# Patient Record
Sex: MALE | Race: WHITE | Employment: UNEMPLOYED | URBAN - METROPOLITAN AREA
[De-identification: names, ages, dates, MRNs, and addresses within clinical notes are randomized per-mention and may not be internally consistent; named-entity substitution may affect disease eponyms.]

---

## 2018-11-07 ENCOUNTER — TRANSFERRED RECORDS (OUTPATIENT)
Dept: HEALTH INFORMATION MANAGEMENT | Facility: CLINIC | Age: 63
End: 2018-11-07

## 2019-01-07 ENCOUNTER — TRANSFERRED RECORDS (OUTPATIENT)
Dept: HEALTH INFORMATION MANAGEMENT | Facility: CLINIC | Age: 64
End: 2019-01-07

## 2019-01-18 ENCOUNTER — TRANSFERRED RECORDS (OUTPATIENT)
Dept: HEALTH INFORMATION MANAGEMENT | Facility: CLINIC | Age: 64
End: 2019-01-18

## 2019-02-04 ENCOUNTER — TRANSFERRED RECORDS (OUTPATIENT)
Dept: HEALTH INFORMATION MANAGEMENT | Facility: CLINIC | Age: 64
End: 2019-02-04

## 2019-02-18 ENCOUNTER — DOCUMENTATION ONLY (OUTPATIENT)
Dept: CARE COORDINATION | Facility: CLINIC | Age: 64
End: 2019-02-18

## 2019-02-21 ENCOUNTER — OFFICE VISIT (OUTPATIENT)
Dept: NEUROLOGY | Facility: CLINIC | Age: 64
End: 2019-02-21

## 2019-02-21 VITALS
BODY MASS INDEX: 41.59 KG/M2 | TEMPERATURE: 98.4 F | HEART RATE: 77 BPM | WEIGHT: 274.4 LBS | DIASTOLIC BLOOD PRESSURE: 73 MMHG | OXYGEN SATURATION: 95 % | SYSTOLIC BLOOD PRESSURE: 148 MMHG | HEIGHT: 68 IN

## 2019-02-21 DIAGNOSIS — G60.9 IDIOPATHIC PERIPHERAL NEUROPATHY: Primary | ICD-10-CM

## 2019-02-21 RX ORDER — COVID-19 ANTIGEN TEST
220 KIT MISCELLANEOUS 2 TIMES DAILY WITH MEALS
COMMUNITY

## 2019-02-21 RX ORDER — DIPHENOXYLATE HYDROCHLORIDE AND ATROPINE SULFATE 2.5; .025 MG/1; MG/1
1 TABLET ORAL
COMMUNITY
Start: 2014-07-17

## 2019-02-21 RX ORDER — DULOXETIN HYDROCHLORIDE 20 MG/1
20 CAPSULE, DELAYED RELEASE ORAL 2 TIMES DAILY
Qty: 180 CAPSULE | Refills: 3 | Status: SHIPPED | OUTPATIENT
Start: 2019-02-21 | End: 2019-04-04

## 2019-02-21 RX ORDER — ASPIRIN 81 MG/1
81 TABLET ORAL
COMMUNITY
Start: 2014-07-17

## 2019-02-21 RX ORDER — TRAZODONE HYDROCHLORIDE 100 MG/1
100 TABLET ORAL
COMMUNITY
Start: 2018-08-31

## 2019-02-21 RX ORDER — ATENOLOL AND CHLORTHALIDONE TABLET 50; 25 MG/1; MG/1
1 TABLET ORAL
COMMUNITY
Start: 2018-08-31

## 2019-02-21 RX ORDER — MIRTAZAPINE 15 MG/1
15 TABLET, FILM COATED ORAL
COMMUNITY
Start: 2018-08-31

## 2019-02-21 RX ORDER — SERTRALINE HYDROCHLORIDE 100 MG/1
100 TABLET, FILM COATED ORAL
COMMUNITY
Start: 2018-08-31

## 2019-02-21 RX ORDER — GABAPENTIN 300 MG/1
300 CAPSULE ORAL 3 TIMES DAILY
COMMUNITY
Start: 2018-09-21

## 2019-02-21 RX ORDER — SIMVASTATIN 40 MG
40 TABLET ORAL
COMMUNITY
Start: 2018-08-31

## 2019-02-21 SDOH — HEALTH STABILITY: MENTAL HEALTH: HOW OFTEN DO YOU HAVE A DRINK CONTAINING ALCOHOL?: NEVER

## 2019-02-21 ASSESSMENT — ENCOUNTER SYMPTOMS
POLYDIPSIA: 1
SINUS CONGESTION: 0
TINGLING: 1
PALPITATIONS: 0
SYNCOPE: 0
DISTURBANCES IN COORDINATION: 1
JOINT SWELLING: 0
DECREASED APPETITE: 0
HEADACHES: 0
POLYPHAGIA: 0
ALTERED TEMPERATURE REGULATION: 1
TROUBLE SWALLOWING: 1
SKIN CHANGES: 0
CHILLS: 1
MYALGIAS: 1
PANIC: 0
HOARSE VOICE: 0
NUMBNESS: 1
FEVER: 0
INSOMNIA: 1
HYPOTENSION: 0
DIZZINESS: 1
SLEEP DISTURBANCES DUE TO BREATHING: 0
HYPERTENSION: 0
NECK MASS: 0
SEIZURES: 0
ORTHOPNEA: 0
WEIGHT LOSS: 0
STIFFNESS: 1
SINUS PAIN: 0
MEMORY LOSS: 1
HALLUCINATIONS: 0
DEPRESSION: 1
SORE THROAT: 0
ARTHRALGIAS: 1
NIGHT SWEATS: 1
TREMORS: 0
PARALYSIS: 0
SMELL DISTURBANCE: 0
NERVOUS/ANXIOUS: 1
POOR WOUND HEALING: 0
SPEECH CHANGE: 0
WEIGHT GAIN: 1
WEAKNESS: 1
EXERCISE INTOLERANCE: 0
LOSS OF CONSCIOUSNESS: 0
MUSCLE CRAMPS: 1
MUSCLE WEAKNESS: 1
FATIGUE: 1
TASTE DISTURBANCE: 0
DECREASED CONCENTRATION: 0
BACK PAIN: 0
NAIL CHANGES: 0
NECK PAIN: 0
LIGHT-HEADEDNESS: 0
INCREASED ENERGY: 1
LEG PAIN: 1

## 2019-02-21 ASSESSMENT — PATIENT HEALTH QUESTIONNAIRE - PHQ9: SUM OF ALL RESPONSES TO PHQ QUESTIONS 1-9: 10

## 2019-02-21 ASSESSMENT — MIFFLIN-ST. JEOR: SCORE: 2019.17

## 2019-02-21 ASSESSMENT — PAIN SCALES - GENERAL: PAINLEVEL: SEVERE PAIN (7)

## 2019-02-21 NOTE — PROGRESS NOTES
DEPARTMENT OF NEUROLOGY    Patient Name:  Luis Patterson  MRN:  [id number]    :  1955  Date of Clinic Visit:  2019  Primary Care Provider:  Prakash Singer    CHIEF COMPLAINT: peripheral neuropathy    HISTORY OF PRESENT ILLNESS:  Luis Patterson is a 63 year old male with history of remote alcohol abuse, CAD, HLD, and depression who presents to general neurology clinic in consultation from the [facility] of neurology for evaluation of peripheral neuropathy. He reports several months of diminished sensation up to the mid thigh, but has no complaint regarding the hands at rest. He has noted left foot catching when he walks and sometimes his left hand gets numb while driving. He describes a feeling like a vice  on L foot, R less often. When he  stands up after sitting for an extended period, he says it feels like needles through his feet. He has also noted hypersensitivity in the feet, that sometimes they feel swollen, cold, or like they are sweating, when none of these things are the case. While walking, he has noted that he does not feel like he knows where his left leg is in space. This does not keep him up at night, but he is on trazodone for sleep. He has noted that after taking a few steps, these sensations become more tolerable. He tried insoles, but this didn't help. He was prescribed gabapentin and has increased up to 600 mg TID, but this makes him feel drowsy and imbalanced. Sometimes he says it feels like the muscles are twitching in the L calf and he can see the muscle rippling under the surface of the skin.     He was a former heavy drinker, but has been sober for 19 years.   Former tobacco use 20 years ago PPD, has started chewing tobacco for 5 years.    Never had a fasting glucose, A1c 6.2. B12 458, SPEP normal ESR 26, lyme negative,     No gastric surgery, no family hx of pernicious anemia    ASSESSMENT AND PLAN:  1. Length dependent axonal sensory and motor peripheral  "neuropathy- likely secondary to impaired fasting glucose and/or remote EtOH abuse, however as neither of these fit perfectly (HgbA1c not particularly high, has not had alcohol in 19 years), will obtain second round of labs for reversible causes of neuropathy.    Plan:  - Glucose tolerance test  - MMA, B1, B6, TSH reflex fT4  - given instructions to titrate off of gabapentin  - start duloxetine 20 mg BID  - RTC 4-6 weeks    Patient was seen and discussed with Dr. Le.    Nelida Strauss MD  PGY-3 Resident  [facility] Department of Neurology  Pager: [phone]      I saw and evaluated the patient and discussed the plan of care with Dr. Strauss. I agree with the findings, impression and plan as described by Dr. Strauss.   Shira Le MD        PHYSICAL EXAMINATION:  Vitals: /73 (BP Location: Left arm, Patient Position: Sitting, Cuff Size: Adult Regular)   Pulse 77   Temp 98.4  F (36.9  C) (Oral)   Ht 1.727 m (5' 8\")   Wt 124.5 kg (274 lb 6.4 oz)   SpO2 95%   BMI 41.72 kg/m    General: adult male patient, seated on chair, NAD, unaccompanied  HEENT: at/nc, oropharynx clear, mucosa moist  Cardiac: RRR, no m/r/g  Pulmonary: CTAB, nonlabored on RA  Abdomen: soft, nontender, nondistended, BS+  Extremities: warm, dry, w/o edema  Skin: no jaundice or rash. Absent hair growth distal to knees. Onychomycotic toenails.  Psych: pleasant affect and congruent mood, thought content w/o abnormality, process linear and logical  Neuro: fundi benign   Mental status: alert and oriented to person, place, and time; language is fluent with intact comprehension and naming   Cranial nerves: VFF, PERRL, EOMI, no facial asymmetry, facial sensation intact, tongue and uvula are midline, no dysarthria   Motor: No abnormal posture, tone, atrophy, or movements/fasciculations.    Biceps Triceps Deltoid Hip flexor Knee extension Knee flexion Ankle extension Ankle flexion   Right 5 5 5 5 5 5 5 5 5   Left 5 5 5 5 " 5 5 5 5 5    Reflexes: Absent Babinski. Absent Mosher.   Brachioradialis Biceps Triceps Patellar Achilles   Right 1 1 1 0 0   Left 1 1 1 0 0    Sensory: Diminished to light touch, pin prick, and vibration in feet, improved in a gradient manner up calves to knees. Absent proprioception at toes, intact at ankles. +Romberg.   Coordination: Intact FNF and heel-shin. No Dysmetria. No Dysdiadochokinesia.   Gait: Normal width, stride length, turn, and arm swing.    INVESTIGATIONS:   OSH EMG reviewed, in notes tab  OSH labs listed above    REVIEW OF SYSTEMS: 12-point RoS negative except as per HPI.    ALLERGIES:  Allergies   Allergen Reactions     Penicillins Anaphylaxis     MEDICATIONS:  Current Outpatient Medications   Medication Sig Dispense Refill     aspirin 81 MG EC tablet Take 81 mg by mouth       atenolol-chlorthalidone (TENORETIC) 50-25 MG tablet Take 1 tablet by mouth       DULoxetine (CYMBALTA) 20 MG capsule Take 1 capsule (20 mg) by mouth 2 times daily 180 capsule 3     gabapentin (NEURONTIN) 300 MG capsule Take 300 mg by mouth       mirtazapine (REMERON) 15 MG tablet Take 15 mg by mouth       Multiple Vitamin (MULTI-VITAMINS) TABS Take 1 tablet by mouth       naproxen sodium 220 MG capsule Take 220 mg by mouth 2 times daily (with meals)       sertraline (ZOLOFT) 100 MG tablet Take 100 mg by mouth       simvastatin (ZOCOR) 40 MG tablet Take 40 mg by mouth       traZODone (DESYREL) 100 MG tablet Take 100 mg by mouth       PAST MEDICAL HISTORY:  No past medical history on file.  PAST SURGICAL HISTORY:  No past surgical history on file.  SOCIAL HISTORY:  Social History     Socioeconomic History     Marital status:      Spouse name: Not on file     Number of children: Not on file     Years of education: Not on file     Highest education level: Not on file   Occupational History     Not on file   Social Needs     Financial resource strain: Not on file     Food insecurity:     Worry: Not on file     Inability:  Not on file     Transportation needs:     Medical: Not on file     Non-medical: Not on file   Tobacco Use     Smoking status: Never Smoker     Smokeless tobacco: Current User     Types: Chew   Substance and Sexual Activity     Alcohol use: No     Frequency: Never     Drug use: No     Sexual activity: Not on file   Lifestyle     Physical activity:     Days per week: Not on file     Minutes per session: Not on file     Stress: Not on file   Relationships     Social connections:     Talks on phone: Not on file     Gets together: Not on file     Attends [employer] service: Not on file     Active member of club or organization: Not on file     Attends meetings of clubs or organizations: Not on file     Relationship status: Not on file     Intimate partner violence:     Fear of current or ex partner: Not on file     Emotionally abused: Not on file     Physically abused: Not on file     Forced sexual activity: Not on file   Other Topics Concern     Not on file   Social History Narrative     Not on file     FAMILY HISTORY:  No family history on file.    Answers for HPI/ROS submitted by the patient on 2/21/2019   General Symptoms: Yes  Skin Symptoms: Yes  HENT Symptoms: Yes  EYE SYMPTOMS: No  HEART SYMPTOMS: Yes  LUNG SYMPTOMS: No  INTESTINAL SYMPTOMS: No  URINARY SYMPTOMS: No  REPRODUCTIVE SYMPTOMS: No  SKELETAL SYMPTOMS: Yes  BLOOD SYMPTOMS: No  NERVOUS SYSTEM SYMPTOMS: Yes  MENTAL HEALTH SYMPTOMS: Yes  Fever: No  Loss of appetite: No  Weight loss: No  Weight gain: Yes  Fatigue: Yes  Night sweats: Yes  Chills: Yes  Increased stress: Yes  Excessive hunger: No  Excessive thirst: Yes  Feeling hot or cold when others believe the temperature is normal: Yes  Loss of height: No  Post-operative complications: No  Surgical site pain: No  Hallucinations: No  Change in or Loss of Energy: Yes  Hyperactivity: No  Confusion: No  Changes in hair: No  Changes in moles/birth marks: No  Itching: Yes  Rashes: Yes  Changes in nails: No  Acne:  No  Change in facial hair: No  Warts: No  Non-healing sores: No  Scarring: No  Flaking of skin: No  Color changes of hands/feet in cold : Yes  Sun sensitivity: Yes  Skin thickening: No  Ear pain: No  Ear discharge: No  Hearing loss: Yes  Tinnitus: No  Nosebleeds: No  Congestion: No  Sinus pain: No  Trouble swallowing: Yes   Voice hoarseness: No  Mouth sores: No  Sore throat: No  Tooth pain: No  Gum tenderness: No  Bleeding gums: No  Change in taste: No  Change in sense of smell: No  Dry mouth: Yes  Hearing aid used: No  Neck lump: No  Chest pain or pressure: No  Fast or irregular heartbeat: No  Pain in legs with walking: Yes  Trouble breathing while lying down: No  Fingers or toes appear blue: No  High blood pressure: No  Low blood pressure: No  Fainting: No  Murmurs: No  Pacemaker: No  Varicose veins: No  Edema or swelling: Yes  Wake up at night with shortness of breath: No  Light-headedness: No  Exercise intolerance: No  Back pain: No  Muscle aches: Yes  Neck pain: No  Swollen joints: No  Joint pain: Yes  Bone pain: No  Muscle cramps: Yes  Muscle weakness: Yes  Joint stiffness: Yes  Bone fracture: No  Trouble with coordination: Yes  Dizziness or trouble with balance: Yes  Fainting or black-out spells: No  Memory loss: Yes  Headache: No  Seizures: No  Speech problems: No  Tingling: Yes  Tremor: No  Weakness: Yes  Difficulty walking: Yes  Paralysis: No  Numbness: Yes  Nervous or Anxious: Yes  Depression: Yes  Trouble sleeping: Yes  Trouble thinking or concentrating: No  Mood changes: Yes  Panic attacks: No

## 2019-02-21 NOTE — PATIENT INSTRUCTIONS
Have blood tests drawn and results faxed to the neurology clinic.     Gradually decrease gabapentin. Stop taking the midday dose. After one week stop taking the am dose. Then after another week stop all together.    Start taking duloxetine or cymbalta 20 mg twice daily.    Return to clinic in 4-6 weeks to see how you are doing and follow up the lab results.

## 2019-02-21 NOTE — LETTER
2019       RE: Luis Patterson  [id number]  [facility] [address]     Dear Colleague,    Thank you for referring your patient, Luis Patterson, to the [facility] NEUROLOGY at [facility]. Please see a copy of my visit note below.      DEPARTMENT OF NEUROLOGY    Patient Name:  Luis Patterson  MRN:  [id number]    :  1955  Date of Clinic Visit:  2019  Primary Care Provider:  Prakash Singer    CHIEF COMPLAINT: peripheral neuropathy    HISTORY OF PRESENT ILLNESS:  Luis Patterson is a 63 year old male with history of remote alcohol abuse, CAD, HLD, and depression who presents to general neurology clinic in consultation from the [facility] of neurology for evaluation of peripheral neuropathy. He reports several months of diminished sensation up to the mid thigh, but has no complaint regarding the hands at rest. He has noted left foot catching when he walks and sometimes his left hand gets numb while driving. He describes a feeling like a vice  on L foot, R less often. When he  stands up after sitting for an extended period, he says it feels like needles through his feet. He has also noted hypersensitivity in the feet, that sometimes they feel swollen, cold, or like they are sweating, when none of these things are the case. While walking, he has noted that he does not feel like he knows where his left leg is in space. This does not keep him up at night, but he is on trazodone for sleep. He has noted that after taking a few steps, these sensations become more tolerable. He tried insoles, but this didn't help. He was prescribed gabapentin and has increased up to 600 mg TID, but this makes him feel drowsy and imbalanced. Sometimes he says it feels like the muscles are twitching in the L calf and he can see the muscle rippling under the surface of the skin.     He was a former heavy drinker, but has been sober for 19 years.   Former tobacco use 20 years ago  "PPD, has started chewing tobacco for 5 years.    Never had a fasting glucose, A1c 6.2. B12 458, SPEP normal ESR 26, lyme negative,     No gastric surgery, no family hx of pernicious anemia    ASSESSMENT AND PLAN:  1. Length dependent axonal sensory and motor peripheral neuropathy- likely secondary to impaired fasting glucose and/or remote EtOH abuse, however as neither of these fit perfectly (HgbA1c not particularly high, has not had alcohol in 19 years), will obtain second round of labs for reversible causes of neuropathy.    Plan:  - Glucose tolerance test  - MMA, B1, B6, TSH reflex fT4  - given instructions to titrate off of gabapentin  - start duloxetine 20 mg BID  - RTC 4-6 weeks    Patient was seen and discussed with Dr. Le.    Nelida Strauss MD  PGY-3 Resident  [facility] Department of Neurology  Pager: [phone]    I saw and evaluated the patient and discussed the plan of care with Dr. Strauss. I agree with the findings, impression and plan as described by Dr. Strauss.     Shira Le MD    PHYSICAL EXAMINATION:  Vitals: /73 (BP Location: Left arm, Patient Position: Sitting, Cuff Size: Adult Regular)   Pulse 77   Temp 98.4  F (36.9  C) (Oral)   Ht 1.727 m (5' 8\")   Wt 124.5 kg (274 lb 6.4 oz)   SpO2 95%   BMI 41.72 kg/m     General: adult male patient, seated on chair, NAD, unaccompanied  HEENT: at/nc, oropharynx clear, mucosa moist  Cardiac: RRR, no m/r/g  Pulmonary: CTAB, nonlabored on RA  Abdomen: soft, nontender, nondistended, BS+  Extremities: warm, dry, w/o edema  Skin: no jaundice or rash. Absent hair growth distal to knees. Onychomycotic toenails.  Psych: pleasant affect and congruent mood, thought content w/o abnormality, process linear and logical  Neuro: fundi benign   Mental status: alert and oriented to person, place, and time; language is fluent with intact comprehension and naming   Cranial nerves: VFF, PERRL, EOMI, no facial asymmetry, facial " sensation intact, tongue and uvula are midline, no dysarthria   Motor: No abnormal posture, tone, atrophy, or movements/fasciculations.    Biceps Triceps Deltoid Hip flexor Knee extension Knee flexion Ankle extension Ankle flexion   Right 5 5 5 5 5 5 5 5 5   Left 5 5 5 5 5 5 5 5 5    Reflexes: Absent Babinski. Absent Mosher.   Brachioradialis Biceps Triceps Patellar Achilles   Right 1 1 1 0 0   Left 1 1 1 0 0    Sensory: Diminished to light touch, pin prick, and vibration in feet, improved in a gradient manner up calves to knees. Absent proprioception at toes, intact at ankles. +Romberg.   Coordination: Intact FNF and heel-shin. No Dysmetria. No Dysdiadochokinesia.   Gait: Normal width, stride length, turn, and arm swing.    INVESTIGATIONS:   OSH EMG reviewed, in notes tab  OSH labs listed above    REVIEW OF SYSTEMS: 12-point RoS negative except as per HPI.    ALLERGIES:  Allergies   Allergen Reactions     Penicillins Anaphylaxis     MEDICATIONS:  Current Outpatient Medications   Medication Sig Dispense Refill     aspirin 81 MG EC tablet Take 81 mg by mouth       atenolol-chlorthalidone (TENORETIC) 50-25 MG tablet Take 1 tablet by mouth       DULoxetine (CYMBALTA) 20 MG capsule Take 1 capsule (20 mg) by mouth 2 times daily 180 capsule 3     gabapentin (NEURONTIN) 300 MG capsule Take 300 mg by mouth       mirtazapine (REMERON) 15 MG tablet Take 15 mg by mouth       Multiple Vitamin (MULTI-VITAMINS) TABS Take 1 tablet by mouth       naproxen sodium 220 MG capsule Take 220 mg by mouth 2 times daily (with meals)       sertraline (ZOLOFT) 100 MG tablet Take 100 mg by mouth       simvastatin (ZOCOR) 40 MG tablet Take 40 mg by mouth       traZODone (DESYREL) 100 MG tablet Take 100 mg by mouth       PAST MEDICAL HISTORY:  No past medical history on file.  PAST SURGICAL HISTORY:  No past surgical history on file.  SOCIAL HISTORY:  Social History     Socioeconomic History     Marital status:      Spouse name: Not  on file     Number of children: Not on file     Years of education: Not on file     Highest education level: Not on file   Occupational History     Not on file   Social Needs     Financial resource strain: Not on file     Food insecurity:     Worry: Not on file     Inability: Not on file     Transportation needs:     Medical: Not on file     Non-medical: Not on file   Tobacco Use     Smoking status: Never Smoker     Smokeless tobacco: Current User     Types: Chew   Substance and Sexual Activity     Alcohol use: No     Frequency: Never     Drug use: No     Sexual activity: Not on file   Lifestyle     Physical activity:     Days per week: Not on file     Minutes per session: Not on file     Stress: Not on file   Relationships     Social connections:     Talks on phone: Not on file     Gets together: Not on file     Attends Baptism service: Not on file     Active member of club or organization: Not on file     Attends meetings of clubs or organizations: Not on file     Relationship status: Not on file     Intimate partner violence:     Fear of current or ex partner: Not on file     Emotionally abused: Not on file     Physically abused: Not on file     Forced sexual activity: Not on file   Other Topics Concern     Not on file   Social History Narrative     Not on file     FAMILY HISTORY:  No family history on file.    Again, thank you for allowing me to participate in the care of your patient.      Sincerely,    Nelida Strauss MD

## 2019-02-22 ENCOUNTER — TELEPHONE (OUTPATIENT)
Dept: NEUROLOGY | Facility: CLINIC | Age: 64
End: 2019-02-22

## 2019-02-22 NOTE — TELEPHONE ENCOUNTER
[facility] Call Center    Phone Message    May a detailed message be left on voicemail: yes    Reason for Call: Other: Per call from Sydnee, PT is currently in the clinic waiting to have labs draw.  The hard copy lab orders with the PT wasn't signed. Sydnee is requesting for a signed lab order faxed to [phone] ASAP     Action Taken: Message routed to:  Other: Neurology

## 2019-02-25 ENCOUNTER — CARE COORDINATION (OUTPATIENT)
Dept: NEUROLOGY | Facility: CLINIC | Age: 64
End: 2019-02-25

## 2019-02-25 NOTE — TELEPHONE ENCOUNTER
Message sent to Dr. Strauss asking her to sign her clinic note and that the lab orders are not signed.  Patient is wanting to have his labs drawn.  Fax to [phone] once the orders are signed.

## 2019-02-27 ENCOUNTER — TRANSFERRED RECORDS (OUTPATIENT)
Dept: HEALTH INFORMATION MANAGEMENT | Facility: CLINIC | Age: 64
End: 2019-02-27

## 2019-02-28 ENCOUNTER — TELEPHONE (OUTPATIENT)
Dept: NEUROLOGY | Facility: CLINIC | Age: 64
End: 2019-02-28

## 2019-02-28 NOTE — TELEPHONE ENCOUNTER
[facility] Call Center    Phone Message    May a detailed message be left on voicemail: yes    Reason for Call: Other: Sydnee calling to report that they were unable to complete the Glucose test due to Luis's blood test being high.  The normal range is  and his was 131.  The test was attempted yesterday (2/27) in the morning.  Sydnee states that if Dr Strauss is wanting this attempted again, that a new order would need to be placed.  Their fax number is [phone].  Please call Sydnee with any questions regarding this and she suggests reaching out to Luis to discuss next steps     Action Taken: Message routed to:  [facility]

## 2019-03-04 ENCOUNTER — TELEPHONE (OUTPATIENT)
Dept: NEUROLOGY | Facility: CLINIC | Age: 64
End: 2019-03-04

## 2019-03-04 DIAGNOSIS — G60.9 IDIOPATHIC PERIPHERAL NEUROPATHY: Primary | ICD-10-CM

## 2019-03-04 NOTE — TELEPHONE ENCOUNTER
I sent a message to Dr. Strauss letting her know that the lab was unable to complete the glucose test because his blood sugar was 131.  We can do this test again but Dr. Strauss will need to place a new order.  I called Luis to let him know that I sent a message to Dr. Strauss and if she reorders this test I will let him know and fax the order to [facility] [phone].    Message sent to Dr. Strauss

## 2019-03-04 NOTE — TELEPHONE ENCOUNTER
[facility] Call Center    Phone Message    May a detailed message be left on voicemail: yes    Reason for Call: Other: Pt requesting call back. Pt stated that he was unable to get the test done for his blood sugar due to it being to high and he would like to discuss what the next steps are from here     Action Taken: Message routed to:  [facility] neuro

## 2019-03-05 ENCOUNTER — CARE COORDINATION (OUTPATIENT)
Dept: NEUROLOGY | Facility: CLINIC | Age: 64
End: 2019-03-05

## 2019-03-05 NOTE — PROGRESS NOTES
, MD Bertha Zendejas Tracey, RN             It is ordered. It needs to be clear that he is fasting for this. If his initial glucose is too high again, then I think he has met criteria for a diagnosis of diabetes from that alone.    Previous Messages      ----- Message -----   From: Virginie Stone RN   Sent: 3/4/2019   1:17 PM   To: Nelida Strauss MD     Lab was unable to complete is glucose lab because his blood sugar was too high.  131.  If you would like him to attempt this again a new order will need to be placed.  Please place order if in agreement.     Thank you,   Virginie         I faxed the glucose lab to [facility].  [phone].  I called Luis to let him know that I faxed this and he should call to schedule an appointment.

## 2019-03-08 ENCOUNTER — TRANSFERRED RECORDS (OUTPATIENT)
Dept: HEALTH INFORMATION MANAGEMENT | Facility: CLINIC | Age: 64
End: 2019-03-08

## 2019-04-04 ENCOUNTER — OFFICE VISIT (OUTPATIENT)
Dept: NEUROLOGY | Facility: CLINIC | Age: 64
End: 2019-04-04

## 2019-04-04 VITALS
HEART RATE: 71 BPM | WEIGHT: 209.6 LBS | DIASTOLIC BLOOD PRESSURE: 75 MMHG | HEIGHT: 68 IN | BODY MASS INDEX: 31.77 KG/M2 | SYSTOLIC BLOOD PRESSURE: 133 MMHG | TEMPERATURE: 98.1 F

## 2019-04-04 DIAGNOSIS — G60.9 IDIOPATHIC PERIPHERAL NEUROPATHY: ICD-10-CM

## 2019-04-04 RX ORDER — DULOXETIN HYDROCHLORIDE 20 MG/1
60 CAPSULE, DELAYED RELEASE ORAL DAILY
Qty: 180 CAPSULE | Refills: 3
Start: 2019-04-04 | End: 2021-12-03

## 2019-04-04 ASSESSMENT — PAIN SCALES - GENERAL: PAINLEVEL: NO PAIN (0)

## 2019-04-04 ASSESSMENT — MIFFLIN-ST. JEOR: SCORE: 1725.24

## 2019-04-04 NOTE — NURSING NOTE
Chief Complaint   Patient presents with     RECHECK     UMP RETURN MUSCLE WEAKNESS       Mariama Yuen, SHAWNN

## 2019-04-04 NOTE — PATIENT INSTRUCTIONS
Increase duloxetine to 60 mg daily, you can take this all at once or 20 in the am and 40 in the pm.    Gradually decrease the gabapentin and then stop taking it.    Discuss new diagnosis of diabetes with your primary doctor.    Keep up the good work with physical activity, exercise, and dietary changes.    Follow up in neurology clinic in 6 months.

## 2019-04-04 NOTE — PROGRESS NOTES
"  DEPARTMENT OF NEUROLOGY    Patient Name:  Luis Patterson  MRN:  [id number]    :  1955  Date of Clinic Visit:  2019  Primary Care Provider:  Prakash Singer        FOLLOW UP APPOINTMENT       HPI: 63 M hx remote EtOH abuse (19 years in remission), CAD, HLD, and depression who is followed for peripheral neuropathy. He was last seen 19.    INTERVAL HISTORY:  Neuropathy labs completed, found to have impaired response to glucose load, so now meets diagnosis of DM by two criteria (A1c 6.2 also). In response to concerns about these symptoms, he reports that he has lost 65 pounds in the past two months through diet and exercise.     He is no longer getting the vice  feeling in his legs, but his toes still hurt, still feel swelled up, legs still feel like they are sweating event though they are neither swollen nor sweating. He reports that the gabapentin didn't do anything on it's own, and the duloxetine is helping some.     He has fallen twice since he was last seen- left leg gave out from underneath him both times.     Vital signs:  /75 (BP Location: Left arm, Patient Position: Sitting, Cuff Size: Adult Regular)   Pulse 71   Temp 98.1  F (36.7  C) (Oral)   Ht 1.727 m (5' 8\")   Wt 95.1 kg (209 lb 9.6 oz)   PF 94 L/min   BMI 31.87 kg/m      Examination:   Physical exam is unchanged since last visit.     -General: Adult male patient, sitting comfortably on the chair. No acute distress    -HEENT: No skin discolorations noted, no carotid bruits     -Chest: RRR without murmurs or bruits     -Abdomen: Positive bowel sounds, soft, non-tender, no organomegaly    -Musculoskeletal: No abnormalities noted     -Neurological:     --MS: Patient is alert, attentive, and oriented. Speech is clear and fluid. Names normally. Registration, recall and remote memory intact. Mental math normal.     --CNs: Visual fields are full to confrontation. Normal fundoscopic exam with no visualized vascular changes. " Pupils are briskly reactive to light. Visual fields full. Ocular motility full without nystagmus, facial sensation intact, muscles of mastication and facial expression normal, hearing intact, gag and palate elevation normal, sternomastoid and trapezius function normal, tongue motions normal     --Motor: Normal muscle tone and bulk. 5/5 muscle strength bilaterally     --Reflexes: Brisk reflexes at knees and biceps and ankles. Plantar responses extensor bilaterally    --Sensory: Light touch, vibration and PP diminished in feet, pin increases in a graded fashion to normal a mid calf. Proprioception intact. Normal sensation at fingertips.    --Coordination: Rapidly alternating movements of fingers intact. Heel-shin and finger-nose-finger is intact. Negative Romberg.     --Gait: Stands with feet normally spaced. Gait is steady.    Assessment/Plan:   1. Peripheral neuropathy- secondary to diabetes, remote alcohol may have played a roll.  -Discuss treatment strategies with PCP, can make an endocrinology referral if he desires.   -Gradual taper off of gabapentin  -Increase duloxetine to 60 mg daily, okay to split dose  -RTC 6 months    Nelida Strauss MD  Neurology PGY-3  [facility]  Pager [phone]    Patient has been seen with Dr. Garcia.    I saw and evaluated the patient with the resident and agree with the assessment and plan. I was present for entire minor examination.    Sonia Garcia MD  Chief, Multiple Sclerosis Division  Department of Neurology  [facility]

## 2019-04-04 NOTE — LETTER
"2019       RE: Luis Patterson  [address]  [facility] [address]     Dear Colleague,    Thank you for referring your patient, Luis Patterson, to the [facility] NEUROLOGY at [facility]. Please see a copy of my visit note below.      DEPARTMENT OF NEUROLOGY    Patient Name:  Luis Patterson  MRN:  [id number]    :  1955  Date of Clinic Visit:  2019  Primary Care Provider:  Prakash Singer        FOLLOW UP APPOINTMENT     HPI: 63 M hx remote EtOH abuse (19 years in remission), CAD, HLD, and depression who is followed for peripheral neuropathy. He was last seen 19.    INTERVAL HISTORY:  Neuropathy labs completed, found to have impaired response to glucose load, so now meets diagnosis of DM by two criteria (A1c 6.2 also). In response to concerns about these symptoms, he reports that he has lost 65 pounds in the past two months through diet and exercise.     He is no longer getting the vice  feeling in his legs, but his toes still hurt, still feel swelled up, legs still feel like they are sweating event though they are neither swollen nor sweating. He reports that the gabapentin didn't do anything on it's own, and the duloxetine is helping some.     He has fallen twice since he was last seen- left leg gave out from underneath him both times.     Vital signs:  /75 (BP Location: Left arm, Patient Position: Sitting, Cuff Size: Adult Regular)   Pulse 71   Temp 98.1  F (36.7  C) (Oral)   Ht 1.727 m (5' 8\")   Wt 95.1 kg (209 lb 9.6 oz)   PF 94 L/min   BMI 31.87 kg/m       Examination:   Physical exam is unchanged since last visit.     -General: Adult male patient, sitting comfortably on the chair. No acute distress    -HEENT: No skin discolorations noted, no carotid bruits     -Chest: RRR without murmurs or bruits     -Abdomen: Positive bowel sounds, soft, non-tender, no organomegaly    -Musculoskeletal: No abnormalities noted     -Neurological:     --MS: " Patient is alert, attentive, and oriented. Speech is clear and fluid. Names normally. Registration, recall and remote memory intact. Mental math normal.     --CNs: Visual fields are full to confrontation. Normal fundoscopic exam with no visualized vascular changes. Pupils are briskly reactive to light. Visual fields full. Ocular motility full without nystagmus, facial sensation intact, muscles of mastication and facial expression normal, hearing intact, gag and palate elevation normal, sternomastoid and trapezius function normal, tongue motions normal     --Motor: Normal muscle tone and bulk. 5/5 muscle strength bilaterally     --Reflexes: Brisk reflexes at knees and biceps and ankles. Plantar responses extensor bilaterally    --Sensory: Light touch, vibration and PP diminished in feet, pin increases in a graded fashion to normal a mid calf. Proprioception intact. Normal sensation at fingertips.    --Coordination: Rapidly alternating movements of fingers intact. Heel-shin and finger-nose-finger is intact. Negative Romberg.     --Gait: Stands with feet normally spaced. Gait is steady.    Assessment/Plan:   1. Peripheral neuropathy- secondary to diabetes, remote alcohol may have played a roll.  -Discuss treatment strategies with PCP, can make an endocrinology referral if he desires.   -Gradual taper off of gabapentin  -Increase duloxetine to 60 mg daily, okay to split dose  -RTC 6 months    Nelida Strauss MD  Neurology PGY-3  [facility]  Pager [phone]    Patient has been seen with Dr. Garcia.    I saw and evaluated the patient with the resident and agree with the assessment and plan. I was present for entire minor examination.    Sonia Garcia MD  Chief, Multiple Sclerosis Division  Department of Neurology  [facility]

## 2019-05-13 ENCOUNTER — TELEPHONE (OUTPATIENT)
Dept: NEUROLOGY | Facility: CLINIC | Age: 64
End: 2019-05-13

## 2019-05-13 NOTE — TELEPHONE ENCOUNTER
Nurse received second request for script clarification on Dulonetine 20 mg. Script sent to pharmacy, however for uknown reason they did not get it.  Nurse called script to pharmacy and left on call lione there:   Duloxetine 20 mg Cap   Take 3 caps ( 60 mg ) Daily.    ( Seen by Dr. Yeager with Nelida Strauss ).    Pharmacy called back asking for quantity and refills.  Gave verbal for 270 caps and 3 refills.  Also asked for approval for 60 mg cap instead, and also gave verbal ok for this.

## 2021-09-22 ENCOUNTER — TRANSCRIBE ORDERS (OUTPATIENT)
Dept: OTHER | Age: 66
End: 2021-09-22

## 2021-09-22 DIAGNOSIS — G60.9 HEREDITARY AND IDIOPATHIC PERIPHERAL NEUROPATHY: Primary | ICD-10-CM

## 2021-12-03 ENCOUNTER — LAB (OUTPATIENT)
Dept: LAB | Facility: CLINIC | Age: 66
End: 2021-12-03
Attending: INTERNAL MEDICINE
Payer: COMMERCIAL

## 2021-12-03 ENCOUNTER — OFFICE VISIT (OUTPATIENT)
Dept: NEUROLOGY | Facility: CLINIC | Age: 66
End: 2021-12-03
Attending: FAMILY MEDICINE
Payer: COMMERCIAL

## 2021-12-03 VITALS
HEART RATE: 63 BPM | DIASTOLIC BLOOD PRESSURE: 81 MMHG | RESPIRATION RATE: 16 BRPM | SYSTOLIC BLOOD PRESSURE: 138 MMHG | OXYGEN SATURATION: 94 %

## 2021-12-03 DIAGNOSIS — R79.9 ABNORMAL FINDING OF BLOOD CHEMISTRY, UNSPECIFIED: ICD-10-CM

## 2021-12-03 DIAGNOSIS — R26.89 IMBALANCE: ICD-10-CM

## 2021-12-03 DIAGNOSIS — G62.9 NEUROPATHY: Primary | ICD-10-CM

## 2021-12-03 DIAGNOSIS — G62.9 NEUROPATHY: ICD-10-CM

## 2021-12-03 LAB
CRP SERPL-MCNC: 19 MG/L (ref 0–8)
ERYTHROCYTE [SEDIMENTATION RATE] IN BLOOD BY WESTERGREN METHOD: 30 MM/HR (ref 0–20)
HBA1C MFR BLD: 13.9 % (ref 0–5.6)
TOTAL PROTEIN SERUM FOR ELP: 7.4 G/DL (ref 6.8–8.8)
VIT B12 SERPL-MCNC: 1204 PG/ML (ref 193–986)

## 2021-12-03 PROCEDURE — 86334 IMMUNOFIX E-PHORESIS SERUM: CPT | Mod: 26 | Performed by: PATHOLOGY

## 2021-12-03 PROCEDURE — 84165 PROTEIN E-PHORESIS SERUM: CPT | Mod: TC | Performed by: PATHOLOGY

## 2021-12-03 PROCEDURE — 83036 HEMOGLOBIN GLYCOSYLATED A1C: CPT | Performed by: PATHOLOGY

## 2021-12-03 PROCEDURE — 36415 COLL VENOUS BLD VENIPUNCTURE: CPT | Performed by: PATHOLOGY

## 2021-12-03 PROCEDURE — 84155 ASSAY OF PROTEIN SERUM: CPT | Performed by: INTERNAL MEDICINE

## 2021-12-03 PROCEDURE — 86140 C-REACTIVE PROTEIN: CPT | Performed by: PATHOLOGY

## 2021-12-03 PROCEDURE — 86235 NUCLEAR ANTIGEN ANTIBODY: CPT | Performed by: INTERNAL MEDICINE

## 2021-12-03 PROCEDURE — 85652 RBC SED RATE AUTOMATED: CPT | Performed by: PATHOLOGY

## 2021-12-03 PROCEDURE — 86334 IMMUNOFIX E-PHORESIS SERUM: CPT | Mod: TC | Performed by: INTERNAL MEDICINE

## 2021-12-03 PROCEDURE — 82607 VITAMIN B-12: CPT | Performed by: PATHOLOGY

## 2021-12-03 PROCEDURE — 99215 OFFICE O/P EST HI 40 MIN: CPT | Performed by: INTERNAL MEDICINE

## 2021-12-03 PROCEDURE — 84165 PROTEIN E-PHORESIS SERUM: CPT | Mod: 26 | Performed by: PATHOLOGY

## 2021-12-03 RX ORDER — NORTRIPTYLINE HCL 10 MG
CAPSULE ORAL
Qty: 120 CAPSULE | Refills: 2 | Status: SHIPPED | OUTPATIENT
Start: 2021-12-03 | End: 2022-06-13

## 2021-12-03 ASSESSMENT — PAIN SCALES - GENERAL: PAINLEVEL: EXTREME PAIN (8)

## 2021-12-03 NOTE — NURSING NOTE
Chief Complaint   Patient presents with     RECHECK     ump return- muscle weakness     Tiffanie Skinner

## 2021-12-03 NOTE — PATIENT INSTRUCTIONS
Stop Cymbalta    Start nortriptyline 10 mg (1 pill) nightly.     If tolerated after 2 weeks and if pain is still not controlled, increase it to 20 mg (2 pills) nightly     Someone will you about the physical therapy    I'll call you after the EMG is done     Lab today

## 2021-12-03 NOTE — PROGRESS NOTES
"[facility] Neurology Consultation    Luis Patterson MRN# [id number]   Age: 66 year old YOB: 1955     Requesting physician: Prakash Brown     Reason for Consultation: numbness/tingling, imbalance      History of Presenting Symptoms:   Luis Patterson is a 66 year old male who presents today for evaluation of numbness/tingling and imbalance.     Numbness in the feet started approximately 5-6 years ago. He had EMG in 2018 diagnosing neuropathy. Patient previously followed in resident clinic with Dr Strauss, last seen in 2019. Things are worse now compared to a few years ago. He still has problems with his walking. He got laid off his work because of this. He has had frequent falls. His feet feel like big blocks. He has numbness up to his thighs. His hands will intermittently \"freeze\". His fingertips also feel numb. This has been going on for a 4-5 months. If he closes his eye in the shower he is not able to balance, which has been an issue for about the last year.    Patient lost 60 pounds, which resulted in improvement of blood sugars.     He has not had any incontinence.     He previously had sciatica, last time was about 5-6 years ago.     He has been sober for 20+ years.      Past Medical History:   There is no problem list on file for this patient.    No past medical history on file.     Past Surgical History:   No past surgical history on file.     Social History:     Social History     Tobacco Use     Smoking status: Never Smoker     Smokeless tobacco: Current User     Types: Chew   Substance Use Topics     Alcohol use: No     Drug use: No        Family History:   No family history on file.     Medications:     Current Outpatient Medications   Medication Sig     aspirin 81 MG EC tablet Take 81 mg by mouth     atenolol-chlorthalidone (TENORETIC) 50-25 MG tablet Take 1 tablet by mouth     DULoxetine (CYMBALTA) 20 MG capsule Take 3 capsules (60 mg) by mouth daily     gabapentin (NEURONTIN) 300 MG " capsule Take 300 mg by mouth     mirtazapine (REMERON) 15 MG tablet Take 15 mg by mouth     Multiple Vitamin (MULTI-VITAMINS) TABS Take 1 tablet by mouth     naproxen sodium 220 MG capsule Take 220 mg by mouth 2 times daily (with meals)     sertraline (ZOLOFT) 100 MG tablet Take 100 mg by mouth     simvastatin (ZOCOR) 40 MG tablet Take 40 mg by mouth     traZODone (DESYREL) 100 MG tablet Take 100 mg by mouth     No current facility-administered medications for this visit.        Allergies:     Allergies   Allergen Reactions     Penicillins Anaphylaxis        Review of Systems:   As above     Physical Exam:   Vitals: /81   Pulse 63   Resp 16   SpO2 94%    General: Seated comfortably in no acute distress.  Lungs: breathing comfortably  Extremities: no edema  Skin: No rashes  Neurologic:     Mental Status: Fully alert, attentive. Normal memory and fund of knowledge. Language normal, speech clear and fluent, no paraphasic errors.     Cranial Nerves: Visual fields intact. PERRL. EOMI with normal smooth pursuit. Facial sensation intact/symmetric. Facial movements symmetric. Hearing not formally tested but intact to conversation. Palate elevation symmetric, uvula midline. No dysarthria. Shoulder shrug strong bilaterally. Tongue protrusion midline.     Motor: No tremors or other abnormal movements observed. Muscle tone normal throughout. Very mildly slowed rapid finger tapping on the left compared to right. Strength 5/5 throughout upper and lower extremities with exception of 2/5 bilateral toe extension.     Deep Tendon Reflexes: 1+/symmetric throughout upper extremities and patella, trace ankle jerks. No clonus. Toes downgoing bilaterally.     Sensory: Decreased sensation to light touch in bilateral index fingers and below the knees bilaterally. Temperature sensation and pinprick is decreased the bilateral knees. Vibration is absent in bilateral great toes, ~5 seconds in bilateral ankles. Positive Romberg.       Coordination: Finger-nose-finger and heel-shin intact without dysmetria. Rapid alternating movements intact/symmetric with normal speed and rhythm.     Gait: Wide based ataxic casual gait. Not able to walk on toes or tandem. Able to walk on heels with moderate difficulty.         Data: Pertinent prior to visit   Imaging:  MRI cervical 2012  Impression:   1. Straightening of the normal cervical lordosis. No high-grade spinal canal   narrowing. Normal cord signal.   2. Small left subarticular and proximal foraminal protrusion at C3-C4 results in   moderate to severe left foraminal narrowing.   3. Multilevel mild to moderate bony foraminal narrowing worse at right C4-C5, left    C5-C6, and left C6-C7.     Procedures:  EMG 2018    Reviewed - primarily axonal    Laboratory:  A1c 6.7 (5/2021)  B12 458, SPEP/immunofixation normal, MMA/B1/B6 negative, ESR 26, lyme negative (2019)         Assessment and Plan:   Assessment:  Luis Patterson is a 66 year old male with history of polyneuropathy who presents today for evaluation of worsening numbness/tingling and imbalance. Neuropathy symptoms date back to at least 2015 and have worsened in the last 2 years. Prior work-up showed diabetes to be the most likely cause of his neuropathy. Examination today shows ataxic gait, decreased reflexes, and decreased sensation in a length dependent fashion. We discussed repeating EMG today to look for progression of neuropathy. Neuropathy labs were also reordered today. MRI cervical spine could be considered, but he is not clearly myelopathic on examination. Physical therapy referral was placed today. We discussed transitioning from Cymbalta to nortriptyline for treatment of neuropathic pain.      Plan:  - EMG (left arm and leg)   - PT referral  - Neuropathy labs; A1c, ELP, immunofixation, ESR, CRP, B12, SSA/SSB  - Stop Cymbalta  - Start nortriptyline 10 mg nightly; if no benefit after 2 weeks can increase to 20 mg nightly     Follow up in  Neurology clinic pending above    Richard Carroll MD   of Neurology  [facility]      The total time of this encounter today amounted to 65 minutes. This time included time spent with the patient, prep work, ordering tests, and performing post visit documentation.

## 2021-12-06 LAB
ALBUMIN SERPL ELPH-MCNC: 4.7 G/DL (ref 3.7–5.1)
ALPHA1 GLOB SERPL ELPH-MCNC: 0.3 G/DL (ref 0.2–0.4)
ALPHA2 GLOB SERPL ELPH-MCNC: 0.8 G/DL (ref 0.5–0.9)
B-GLOBULIN SERPL ELPH-MCNC: 1 G/DL (ref 0.6–1)
ENA SS-A AB SER IA-ACNC: <0.5 U/ML
ENA SS-A AB SER IA-ACNC: NEGATIVE
ENA SS-B IGG SER IA-ACNC: <0.6 U/ML
ENA SS-B IGG SER IA-ACNC: NEGATIVE
GAMMA GLOB SERPL ELPH-MCNC: 0.7 G/DL (ref 0.7–1.6)
M PROTEIN SERPL ELPH-MCNC: 0 G/DL
PROT PATTERN SERPL ELPH-IMP: NORMAL
PROT PATTERN SERPL IFE-IMP: NORMAL

## 2022-02-19 ENCOUNTER — HEALTH MAINTENANCE LETTER (OUTPATIENT)
Age: 67
End: 2022-02-19

## 2022-02-28 ENCOUNTER — OFFICE VISIT (OUTPATIENT)
Dept: NEUROLOGY | Facility: CLINIC | Age: 67
End: 2022-02-28
Attending: INTERNAL MEDICINE
Payer: COMMERCIAL

## 2022-02-28 DIAGNOSIS — G62.9 NEUROPATHY: ICD-10-CM

## 2022-02-28 PROCEDURE — 95913 NRV CNDJ TEST 13/> STUDIES: CPT | Performed by: INTERNAL MEDICINE

## 2022-02-28 PROCEDURE — 95885 MUSC TST DONE W/NERV TST LIM: CPT | Performed by: INTERNAL MEDICINE

## 2022-02-28 NOTE — LETTER
2022       RE: Luis Patterson  [address]  [place] [address]     Dear Colleague,    Thank you for referring your patient, Luis Patterson, to the [facility] EMG CLINIC [place] at [facility]. Please see a copy of my visit note below.                        [facility]  Electrodiagnostic Laboratory                 Department of Neurology                                                                                                         Test Date:  2022    Patient: Luis Patterson : 1955 Physician: Richard Carroll MD   Sex: Male AGE: 66 year Ref Phys:    ID#: [id number]   Technician: GREGORY Medrano     Clinical Information:  Patient is a 65 y/o male with history of diabetic polyneuropathy who presents for EMG in the setting of worsening numbness and tingling in the extremities. Patient recently has had about 50% improvement in symptoms, with better control of the diabetes.     Techniques:  Motor and sensory conduction studies were done with surface recording electrodes. EMG was done with a concentric needle electrode.     Results:  Evaluation of the left fibular (EDB) motor and the left tibial (AHB) motor nerves showed prolonged distal onset latency (L6.7, L6.6 ms) and reduced amplitude (L0.22, L0.49 mV).  The right fibular (EDB) motor, the left sural sensory, and the right sural sensory nerves showed no response.  The right tibial (AHB) motor, the left median sensory, the left radial sensory, and the left ulnar sensory nerves showed reduced amplitude (R0.95, L7, L9, L10, L7  V).  The left ulnar (ADM) motor nerve showed decreased conduction velocity (Abv Elbow-Bel Elbow, 27 m/s).  The left superficial fibular sensory nerve showed decreased conduction velocity (18 m/s).  All remaining nerves (as indicated in the following tables) were within normal limits.      Needle evaluation of the left tibialis anterior muscle showed  increased insertional activity, moderately increased Fibs/PSW, slightly increased motor unit amplitude, moderately increased motor unit duration, and recruitment.  The left gastrocnemius muscle showed increased insertional activity, moderately increased Fibs/PSW, slightly increased motor unit amplitude, slightly increased motor unit duration, and recruitment.  All remaining muscles (as indicated in the following table) showed no evidence of electrical instability.        Interpretation:  This is an abnormal examination. There is electrophysiologic evidence of the following:  - Moderate to severe length dependent sensorimotor axonal polyneuropathy. See comment.   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     Comment: In comparison to EMG studies from 2018 and 2019, there has been mild interval worsening of severity of polyneuropathy.   ___________________________  Richard Carroll MD        Nerve Conduction Studies  Motor Sites      Latency Amplitude Neg. Amp Diff Segment Distance Velocity Neg. Dur Neg Area Diff Temperature Comment   Site (ms) Norm (mV) Norm %  cm m/s Norm ms %  C    Left Dp Branch Fibular (TA) Motor   Fib Head 2.7  < 4.2 1.41 -      -  31.8    Pop Fossa 4.5  < 5.7 1.35 - -4.3 Pop Fossa-Fib Head 8 44 - 4.9 - 31.8    Left Fibular (EDB) Motor   Ankle 6.7  < 6.0 0.22  > 2.0  Ankle-EDB 8   7.3  31.8    Right Fibular (EDB) Motor   Ankle NR  < 6.0 NR  > 2.0  Ankle-EDB 8   NR  31.8    Left Median (APB) Motor   Wrist 3.4  < 4.2 6.0  > 5.0  Wrist-APB 8   6.1  32.5    Elbow 8.5 - 5.4 - -10.0 Elbow-Wrist 25 49  > 48 6.5 -12.7 32.7    Left Tibial (AHB) Motor   Ankle 6.6  < 6.5 0.49  > 4.4  Ankle-AHB 8   7.2  31.8    Right Tibial (AHB) Motor   Ankle 6.3  < 6.5 0.95  > 4.4  Ankle-AHB 8   7.9  31    Left Ulnar (ADM) Motor   Wrist 3.3  < 3.5 6.2  > 5.0  Wrist-ADM 8   5.8  32.8    Bel Elbow 6.9 - 5.9 - -4.8 Bel Elbow-Wrist 20 56  > 48 6.4 -3.0 32.8    Abv Elbow 10.2 - 5.5 - -6.8 Abv Elbow-Bel  Elbow 9 27  > 48 6.6 -13.0 32.8    Erb's Pt. 11.6 - 5.6 - 1.82 Erb's Pt.-Abv Elbow 9 64 - 6.9 4.0 32.8      Sensory Sites      Onset Lat Peak Lat Amp (O-P) Amp (P-P) Segment Distance Velocity Temperature   Site ms ms  V Norm  V  cm m/s Norm  C   Left Median Sensory   Wrist-Dig II 2.7 3.6 7  > 10 9 Wrist-Dig II 14 52  > 48 32.8   Left Median (Ortho) Sensory   Palm-Wrist 1.65 2.3 6 - 9 Palm-Wrist - - - 32.8   Left Median-Ulnar Palmar Sensory        Median   Palm-Wrist 1.65 2.3 6 - 9 Palm-Wrist - - - 32.8        Ulnar   Palm-Wrist 1.70 2.3 1 - 7 Palm-Wrist - - - 32.8   Left Radial Sensory   Forearm-Wrist 2.3 3.1 10  > 15 9 Forearm-Wrist 10 43 - 32.8   Left Superficial Fibular Sensory   14 cm-Ankle 6.8 8.1 14  > 3 14 14 cm-Ankle 12.5 18  > 38 23.6   Left Sural Sensory   Calf-Lat Mall NR NR NR  > 5 NR Calf-Lat Mall 14 NR  > 38 31.8   Right Sural Sensory   Calf-Lat Mall NR NR NR  > 5 NR Calf-Lat Mall 14 NR  > 38 31.8   Left Ulnar Sensory   Wrist-Dig V 2.6 3.3 7  > 8 20 Wrist-Dig V 12.5 48  > 48 32.8   Left Ulnar (Ortho) Sensory   Palm-Wrist 1.70 2.3 1 - 7 Palm-Wrist - - - 32.8     Inter-Nerve Comparisons     Nerve 1 Value 1 Nerve 2 Value 2 Parameter Result Normal   Sensory Sites   L Median Palm-Wrist 2.3 ms L Ulnar Palm-Wrist 2.3 ms Peak Lat Diff 0.00 ms <0.30       Electromyography     Side Muscle Ins Act Fibs/PSW Fasc HF Amp Dur Poly Recrt Int Pat   Left Tib Anterior Incr 2+ Nml 0 1+ 2+ 0 Julienne Nml   Left Gastroc Incr 2+ Nml 0 1+ 1+ 0 Julienne Nml   Left Vastus Lat Nml None Nml 0 Nml Nml 0 Nml Nml   Left Gluteus Med Nml None Nml 0 Nml Nml 0 Nml Nml   Left Deltoid Nml None Nml 0 Nml Nml 0 Nml Nml   Left Triceps Nml None Nml 0 Nml Nml 0 Nml Nml   Left EDC Nml None Nml 0 Nml Nml 0 Nml Nml   Left FDI Nml None Nml 0 Nml Nml 0 Nml Nml         NCS Waveforms:    Motor                         Sensory                               Ultrasound Images:          Richard Carroll MD

## 2022-02-28 NOTE — PROGRESS NOTES
[facility]  Electrodiagnostic Laboratory                 Department of Neurology                                                                                                         Test Date:  2022    Patient: Luis Patterson : 1955 Physician: Richard Carroll MD   Sex: Male AGE: 66 year Ref Phys:    ID#: [id number]   Technician: GREGORY Medrano     Clinical Information:  Patient is a 67 y/o male with history of diabetic polyneuropathy who presents for EMG in the setting of worsening numbness and tingling in the extremities. Patient recently has had about 50% improvement in symptoms, with better control of the diabetes.     Techniques:  Motor and sensory conduction studies were done with surface recording electrodes. EMG was done with a concentric needle electrode.     Results:  Evaluation of the left fibular (EDB) motor and the left tibial (AHB) motor nerves showed prolonged distal onset latency (L6.7, L6.6 ms) and reduced amplitude (L0.22, L0.49 mV).  The right fibular (EDB) motor, the left sural sensory, and the right sural sensory nerves showed no response.  The right tibial (AHB) motor, the left median sensory, the left radial sensory, and the left ulnar sensory nerves showed reduced amplitude (R0.95, L7, L9, L10, L7  V).  The left ulnar (ADM) motor nerve showed decreased conduction velocity (Abv Elbow-Bel Elbow, 27 m/s).  The left superficial fibular sensory nerve showed decreased conduction velocity (18 m/s).  All remaining nerves (as indicated in the following tables) were within normal limits.      Needle evaluation of the left tibialis anterior muscle showed increased insertional activity, moderately increased Fibs/PSW, slightly increased motor unit amplitude, moderately increased motor unit duration, and recruitment.  The left gastrocnemius muscle showed increased insertional activity, moderately increased Fibs/PSW, slightly increased motor unit amplitude,  slightly increased motor unit duration, and recruitment.  All remaining muscles (as indicated in the following table) showed no evidence of electrical instability.        Interpretation:  This is an abnormal examination. There is electrophysiologic evidence of the following:  - Moderate to severe length dependent sensorimotor axonal polyneuropathy. See comment.   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     Comment: In comparison to EMG studies from 2018 and 2019, there has been mild interval worsening of severity of polyneuropathy.   ___________________________  Richard Carroll MD        Nerve Conduction Studies  Motor Sites      Latency Amplitude Neg. Amp Diff Segment Distance Velocity Neg. Dur Neg Area Diff Temperature Comment   Site (ms) Norm (mV) Norm %  cm m/s Norm ms %  C    Left Dp Branch Fibular (TA) Motor   Fib Head 2.7  < 4.2 1.41 -      -  31.8    Pop Fossa 4.5  < 5.7 1.35 - -4.3 Pop Fossa-Fib Head 8 44 - 4.9 - 31.8    Left Fibular (EDB) Motor   Ankle 6.7  < 6.0 0.22  > 2.0  Ankle-EDB 8   7.3  31.8    Right Fibular (EDB) Motor   Ankle NR  < 6.0 NR  > 2.0  Ankle-EDB 8   NR  31.8    Left Median (APB) Motor   Wrist 3.4  < 4.2 6.0  > 5.0  Wrist-APB 8   6.1  32.5    Elbow 8.5 - 5.4 - -10.0 Elbow-Wrist 25 49  > 48 6.5 -12.7 32.7    Left Tibial (AHB) Motor   Ankle 6.6  < 6.5 0.49  > 4.4  Ankle-AHB 8   7.2  31.8    Right Tibial (AHB) Motor   Ankle 6.3  < 6.5 0.95  > 4.4  Ankle-AHB 8   7.9  31    Left Ulnar (ADM) Motor   Wrist 3.3  < 3.5 6.2  > 5.0  Wrist-ADM 8   5.8  32.8    Bel Elbow 6.9 - 5.9 - -4.8 Bel Elbow-Wrist 20 56  > 48 6.4 -3.0 32.8    Abv Elbow 10.2 - 5.5 - -6.8 Abv Elbow-Bel Elbow 9 27  > 48 6.6 -13.0 32.8    Erb's Pt. 11.6 - 5.6 - 1.82 Erb's Pt.-Abv Elbow 9 64 - 6.9 4.0 32.8      Sensory Sites      Onset Lat Peak Lat Amp (O-P) Amp (P-P) Segment Distance Velocity Temperature   Site ms ms  V Norm  V  cm m/s Norm  C   Left Median Sensory   Wrist-Dig II 2.7 3.6 7  > 10 9  Wrist-Dig II 14 52  > 48 32.8   Left Median (Ortho) Sensory   Palm-Wrist 1.65 2.3 6 - 9 Palm-Wrist - - - 32.8   Left Median-Ulnar Palmar Sensory        Median   Palm-Wrist 1.65 2.3 6 - 9 Palm-Wrist - - - 32.8        Ulnar   Palm-Wrist 1.70 2.3 1 - 7 Palm-Wrist - - - 32.8   Left Radial Sensory   Forearm-Wrist 2.3 3.1 10  > 15 9 Forearm-Wrist 10 43 - 32.8   Left Superficial Fibular Sensory   14 cm-Ankle 6.8 8.1 14  > 3 14 14 cm-Ankle 12.5 18  > 38 23.6   Left Sural Sensory   Calf-Lat Mall NR NR NR  > 5 NR Calf-Lat Mall 14 NR  > 38 31.8   Right Sural Sensory   Calf-Lat Mall NR NR NR  > 5 NR Calf-Lat Mall 14 NR  > 38 31.8   Left Ulnar Sensory   Wrist-Dig V 2.6 3.3 7  > 8 20 Wrist-Dig V 12.5 48  > 48 32.8   Left Ulnar (Ortho) Sensory   Palm-Wrist 1.70 2.3 1 - 7 Palm-Wrist - - - 32.8     Inter-Nerve Comparisons     Nerve 1 Value 1 Nerve 2 Value 2 Parameter Result Normal   Sensory Sites   L Median Palm-Wrist 2.3 ms L Ulnar Palm-Wrist 2.3 ms Peak Lat Diff 0.00 ms <0.30       Electromyography     Side Muscle Ins Act Fibs/PSW Fasc HF Amp Dur Poly Recrt Int Pat   Left Tib Anterior Incr 2+ Nml 0 1+ 2+ 0 Julienne Nml   Left Gastroc Incr 2+ Nml 0 1+ 1+ 0 Julienne Nml   Left Vastus Lat Nml None Nml 0 Nml Nml 0 Nml Nml   Left Gluteus Med Nml None Nml 0 Nml Nml 0 Nml Nml   Left Deltoid Nml None Nml 0 Nml Nml 0 Nml Nml   Left Triceps Nml None Nml 0 Nml Nml 0 Nml Nml   Left EDC Nml None Nml 0 Nml Nml 0 Nml Nml   Left FDI Nml None Nml 0 Nml Nml 0 Nml Nml         NCS Waveforms:    Motor                         Sensory                               Ultrasound Images:

## 2022-06-13 DIAGNOSIS — G62.9 NEUROPATHY: ICD-10-CM

## 2022-06-13 RX ORDER — NORTRIPTYLINE HCL 10 MG
CAPSULE ORAL
Qty: 120 CAPSULE | Refills: 2 | Status: SHIPPED | OUTPATIENT
Start: 2022-06-13

## 2022-06-13 NOTE — TELEPHONE ENCOUNTER
Rx Authorization:    Requested Medication/ Dose nortriptyline (PAMELOR) 10 MG capsule    Date last refill ordered: 12/03/21    Quantity ordered: 120    # refills: 2    Date of last clinic visit with ordering provider: 2/28/22    Date of next clinic visit with ordering provider:     All pertinent protocol data (lab date/result):     Include pertinent information from patients message:

## 2022-10-22 ENCOUNTER — HEALTH MAINTENANCE LETTER (OUTPATIENT)
Age: 67
End: 2022-10-22

## 2023-04-01 ENCOUNTER — HEALTH MAINTENANCE LETTER (OUTPATIENT)
Age: 68
End: 2023-04-01

## 2024-06-02 ENCOUNTER — HEALTH MAINTENANCE LETTER (OUTPATIENT)
Age: 69
End: 2024-06-02